# Patient Record
Sex: MALE | Race: OTHER | NOT HISPANIC OR LATINO
[De-identification: names, ages, dates, MRNs, and addresses within clinical notes are randomized per-mention and may not be internally consistent; named-entity substitution may affect disease eponyms.]

---

## 2019-01-10 ENCOUNTER — RX RENEWAL (OUTPATIENT)
Age: 39
End: 2019-01-10

## 2019-01-10 PROBLEM — Z00.00 ENCOUNTER FOR PREVENTIVE HEALTH EXAMINATION: Status: ACTIVE | Noted: 2019-01-10

## 2019-07-01 ENCOUNTER — RX RENEWAL (OUTPATIENT)
Age: 39
End: 2019-07-01

## 2019-07-03 ENCOUNTER — RECORD ABSTRACTING (OUTPATIENT)
Age: 39
End: 2019-07-03

## 2019-07-04 ENCOUNTER — RECORD ABSTRACTING (OUTPATIENT)
Age: 39
End: 2019-07-04

## 2019-07-04 DIAGNOSIS — Z83.49 FAMILY HISTORY OF OTHER ENDOCRINE, NUTRITIONAL AND METABOLIC DISEASES: ICD-10-CM

## 2019-07-10 ENCOUNTER — APPOINTMENT (OUTPATIENT)
Dept: GASTROENTEROLOGY | Facility: CLINIC | Age: 39
End: 2019-07-10
Payer: COMMERCIAL

## 2019-07-10 VITALS
OXYGEN SATURATION: 96 % | HEIGHT: 69.5 IN | DIASTOLIC BLOOD PRESSURE: 79 MMHG | SYSTOLIC BLOOD PRESSURE: 112 MMHG | HEART RATE: 78 BPM | WEIGHT: 176 LBS | BODY MASS INDEX: 25.48 KG/M2

## 2019-07-10 PROCEDURE — 36415 COLL VENOUS BLD VENIPUNCTURE: CPT

## 2019-07-10 PROCEDURE — 99214 OFFICE O/P EST MOD 30 MIN: CPT | Mod: 25

## 2019-07-10 RX ORDER — MESALAMINE 1000 MG/1
1000 SUPPOSITORY RECTAL
Refills: 0 | Status: DISCONTINUED | COMMUNITY
End: 2019-07-10

## 2019-07-10 RX ORDER — BUDESONIDE 9 MG/1
9 TABLET, EXTENDED RELEASE ORAL DAILY
Refills: 0 | Status: DISCONTINUED | COMMUNITY
End: 2019-07-10

## 2019-07-10 RX ORDER — PREDNISONE 5 MG/1
5 TABLET ORAL
Refills: 0 | Status: DISCONTINUED | COMMUNITY
End: 2019-07-10

## 2019-07-10 RX ORDER — HYDROCORTISONE ACETATE 1500 MG/15G
10 AEROSOL, FOAM TOPICAL
Refills: 0 | Status: DISCONTINUED | COMMUNITY
End: 2019-07-10

## 2019-07-10 RX ORDER — HYDROCORTISONE ACETATE 25 MG/1
25 SUPPOSITORY RECTAL TWICE DAILY
Refills: 0 | Status: DISCONTINUED | COMMUNITY
End: 2019-07-10

## 2019-07-10 RX ORDER — HYDROCORTISONE 100 MG/60ML
100 ENEMA RECTAL
Refills: 0 | Status: DISCONTINUED | COMMUNITY
End: 2019-07-10

## 2019-07-10 RX ORDER — MESALAMINE 375 MG/1
0.38 CAPSULE, EXTENDED RELEASE ORAL DAILY
Refills: 0 | Status: DISCONTINUED | COMMUNITY
End: 2019-07-10

## 2019-07-10 NOTE — HISTORY OF PRESENT ILLNESS
[FreeTextEntry1] : 38m hx UC here for f/u UC, w/ steroid tapers 2/018 (failed enemas, budesonide), progressive steroid taper requirements/steroid dependent, leading to humira initiation 7/2018 - never f/u since.  Went into complete remission with 1 formed BM daily - no bleeding, but in last month, occ blood, occ anal bulge.  No abd pain. Stools formed, but slightly less so in days leading up to injection day - perhaps 2x/d.  No wt loss. No fever.\par \par Prior hx:\par        Quantiferon neg 3/23/18\par        HBsAg and HBsAb neg, cAb neg\par         \par        Dr. Rodriguez:\par        Dx '08 - flex sig\par        asacol/pred---> asacol---> lialda - takes 1-2 wks prednisone 1x/year.\par        L-sided UC to S. flex 2/2014\par        Path only fro 8/2016 colon w/ at least microscopic IBD changes L>R. \par        Other:\par        Adx pancreatitis, mild 1/3/18 NWH\par        US 1/4/18: No cholelithiasis or bilary tract obstruction. Nonspecific 1.9 cm hypoechoic focus in               pancreatic head region. Mild diffuse  diminished pancreatic echogenicity.\par        CT+ 1/4/18: No evidence for pancreatic mass. Findings suggesting equivocal/early pancreatitis        without complication. Follow-up to resolution is recommended to exclude underlying occult mass.\par        Similar episode at [facility] approx 6y prior. Denies EtOH beyond 1-2 drinks weekly. \par        MRI+/MRCP 3/16/18 normal\par        4/2018 colonoscopy w/ pancolitis, inflammatory polyps - repeat 1y.\par \par Soc:  no tobacco or significant EtOH\par FHx: no FHx GI malignancy or IBD\par \par ROS:\par Constitutional:: no weight loss, fevers\par ENT: no deafness\par Eyes: not blind\par Neck: no LN\par Chest: no dyspnea/cough\par Cardiac: no chest pain\par Vascular: no leg swelling\par GI: no abdominal pain, nausea, vomiting, diarrhea, constipation, rectal bleeding, dysphagia, melena unless otherwise noted in HPI\par : no dysuria, dark urine\par Skin: no rashes, jaundice\par Heme: no bleeding\par \par Px: (VS noted below)\par General: NAD\par Eyes: anicteric\par Oropharynx:  clear\par Neck: no LN\par Chest: normal respiratory effort\par CVS: regular\par Abd: soft, NT, ND, +BS, no HSM\par Ext: no atrophy\par Neuro: grossly nonfocal\par \par

## 2019-07-10 NOTE — ASSESSMENT
[FreeTextEntry1] : colitis: doing better - off steroids x1y.  On humira. Perhaps some resistance building - will check ADA levels.  Start hct suppository to optimize hemorrhoids.  Resume apriso to see if this can add efficacy/control.  Will need colonoscopy once these issues elucidated futher.  Pt agreeable to colonoscopy in upcoming 3-4months.  No evidence of infectious complications at this time.

## 2019-07-10 NOTE — CONSULT LETTER
[Dear  ___] : Dear  [unfilled], [Consult Letter:] : I had the pleasure of evaluating your patient, [unfilled]. [Please see my note below.] : Please see my note below. [FreeTextEntry1] : Thank you very much for allowing me to participate in the care of this patient.  If you have any questions, please do not hesitate to contact me.\par \par Sincerely, \par \par Brien Heath MD\par

## 2019-07-11 LAB
ALBUMIN SERPL ELPH-MCNC: 4.5 G/DL
ALP BLD-CCNC: 51 U/L
ALT SERPL-CCNC: 29 U/L
ANION GAP SERPL CALC-SCNC: 12 MMOL/L
AST SERPL-CCNC: 25 U/L
BASOPHILS # BLD AUTO: 0.09 K/UL
BASOPHILS NFR BLD AUTO: 1.2 %
BILIRUB SERPL-MCNC: 0.8 MG/DL
BUN SERPL-MCNC: 20 MG/DL
CALCIUM SERPL-MCNC: 10.5 MG/DL
CHLORIDE SERPL-SCNC: 100 MMOL/L
CO2 SERPL-SCNC: 27 MMOL/L
CREAT SERPL-MCNC: 1.06 MG/DL
EOSINOPHIL # BLD AUTO: 0.29 K/UL
EOSINOPHIL NFR BLD AUTO: 3.9 %
ERYTHROCYTE [SEDIMENTATION RATE] IN BLOOD BY WESTERGREN METHOD: 19 MM/HR
GLUCOSE SERPL-MCNC: 95 MG/DL
HCT VFR BLD CALC: 49.2 %
HGB BLD-MCNC: 15.6 G/DL
IMM GRANULOCYTES NFR BLD AUTO: 0.3 %
LYMPHOCYTES # BLD AUTO: 3.09 K/UL
LYMPHOCYTES NFR BLD AUTO: 41.7 %
MAN DIFF?: NORMAL
MCHC RBC-ENTMCNC: 29.8 PG
MCHC RBC-ENTMCNC: 31.7 GM/DL
MCV RBC AUTO: 94.1 FL
MONOCYTES # BLD AUTO: 0.71 K/UL
MONOCYTES NFR BLD AUTO: 9.6 %
NEUTROPHILS # BLD AUTO: 3.21 K/UL
NEUTROPHILS NFR BLD AUTO: 43.3 %
PLATELET # BLD AUTO: 362 K/UL
POTASSIUM SERPL-SCNC: 4.4 MMOL/L
PROT SERPL-MCNC: 7.4 G/DL
RBC # BLD: 5.23 M/UL
RBC # FLD: 12.8 %
SODIUM SERPL-SCNC: 139 MMOL/L
WBC # FLD AUTO: 7.41 K/UL

## 2019-07-17 LAB
ADALIMUMAB AB SERPL-MCNC: 27 NG/ML
ADALIMUMAB SERPL-MCNC: 7.5 UG/ML

## 2019-09-16 ENCOUNTER — RX RENEWAL (OUTPATIENT)
Age: 39
End: 2019-09-16

## 2019-11-18 ENCOUNTER — APPOINTMENT (OUTPATIENT)
Dept: GASTROENTEROLOGY | Facility: HOSPITAL | Age: 39
End: 2019-11-18

## 2019-12-08 ENCOUNTER — RESULT REVIEW (OUTPATIENT)
Age: 39
End: 2019-12-08

## 2019-12-09 ENCOUNTER — APPOINTMENT (OUTPATIENT)
Dept: GASTROENTEROLOGY | Facility: HOSPITAL | Age: 39
End: 2019-12-09

## 2020-01-06 ENCOUNTER — CLINICAL ADVICE (OUTPATIENT)
Age: 40
End: 2020-01-06

## 2020-01-13 ENCOUNTER — RX RENEWAL (OUTPATIENT)
Age: 40
End: 2020-01-13

## 2020-01-13 ENCOUNTER — MOBILE ON CALL (OUTPATIENT)
Age: 40
End: 2020-01-13

## 2020-06-17 ENCOUNTER — APPOINTMENT (OUTPATIENT)
Dept: GASTROENTEROLOGY | Facility: CLINIC | Age: 40
End: 2020-06-17
Payer: COMMERCIAL

## 2020-06-17 DIAGNOSIS — K64.9 UNSPECIFIED HEMORRHOIDS: ICD-10-CM

## 2020-06-17 PROCEDURE — 99214 OFFICE O/P EST MOD 30 MIN: CPT | Mod: 95

## 2020-06-17 NOTE — ASSESSMENT
[FreeTextEntry1] : colitis: remission.  Check labs as below.  Cont humira weekly.  Colonoscopy 2021.  Derm check annually - pt advised.\par \par hemorrhoids - noncompliant w/ tx - rec tx steroid suppository \par

## 2020-06-17 NOTE — HISTORY OF PRESENT ILLNESS
[Home] : at home, [unfilled] , at the time of the visit. [Medical Office: ([facility])___] : at the medical office located in  [FreeTextEntry1] : A verbal consent was previously obtained from the patient for this specific telehealth visit (see attached document). Visit done from my office in [place], [place].\par \par Time started: 10:04\par Time ended: 10:21\par Total time (minutes): 27min and > 50% of the time spent in the encounter involved counseling and coordination of care for any or all of the diagnoses submitted \par \par CHIEF COMPLAINT: \par colitis\par \par HPI:  \par 39m hx UC here for f/u UC, w/ steroid tapers 2/018 (failed enemas, budesonide), progressive steroid taper requirements/steroid dependent, leading to humira initiation 7/2018, increased to weekly w/ marginal levela, patchy colitis 1/2020 L>>R.  Formed BM daily - no bleeding, chronic prolapsing hemorrhoids when diarrhea worsens - never compliant w/ steroid suppositories as has been recommended on many occasions.  No Abs to ADA 1/2020.   No wt loss. No fever.  Not taking mesalamine anymore.\par \par Prior hx:\par  Quantiferon neg 3/23/18\par  HBsAg and HBsAb neg, cAb neg\par  \par  Dr. Rodriguez:\par  Dx '08 - flex sig\par  asacol/pred---> asacol---> lialda - takes 1-2 wks prednisone 1x/year.\par  L-sided UC to S. flex 2/2014\par  Path only fro 8/2016 colon w/ at least microscopic IBD changes L>R. \par  Other:\par  Adx pancreatitis, mild 1/3/18 NWH\par  US 1/4/18: No cholelithiasis or bilary tract obstruction. Nonspecific 1.9 cm hypoechoic focus in pancreatic head region. Mild diffuse diminished pancreatic echogenicity.\par  CT+ 1/4/18: No evidence for pancreatic mass. Findings suggesting equivocal/early pancreatitis without complication. Follow-up to resolution is recommended to exclude underlying occult mass.\par  Similar episode at [facility] approx 6y prior. Denies EtOH beyond 1-2 drinks weekly. \par  MRI+/MRCP 3/16/18 normal\par  4/2018 colonoscopy w/ pancolitis, inflammatory polyps - repeat 1y.\par 7/2019 ADA 7.5, low Ab titer 27\par 12/2019 colitis L>>R, hemorrhoids, inflammatory polyp - repeat 1-2y.\par Increased humira to weekly 1/2020.\par \par Soc:  no tobacco or significant EtOH\par FHx: no FHx GI malignancy or IBD\par \par ROS:\par Constitutional:: no weight loss, fevers\par ENT: no deafness\par Eyes: not blind\par Neck: no LN\par Chest: no dyspnea/cough\par Cardiac: no chest pain\par Vascular: no leg swelling\par GI: no abdominal pain, nausea, vomiting, diarrhea, constipation, rectal bleeding, dysphagia, melena unless otherwise noted in HPI\par : no dysuria, dark urine\par Skin: no rashes, jaundice\par Heme: no bleeding\par Endocrine: no DM unless otherwise stated in HPI\par \par Px: telehealth.  NAD\par \par Labs/imaging/prior endoscopic results reviewed to the extent available and noted in HP.  IA verbal consent was previously obtained from the patient for this specific telehealth visit (see attached document). Visit done from my office in [place], [place].   Pt at home. Others present: none. Privacy concerns addressed.\par \par

## 2020-07-06 ENCOUNTER — RX RENEWAL (OUTPATIENT)
Age: 40
End: 2020-07-06

## 2021-02-03 ENCOUNTER — RX RENEWAL (OUTPATIENT)
Age: 41
End: 2021-02-03

## 2021-07-29 ENCOUNTER — RX RENEWAL (OUTPATIENT)
Age: 41
End: 2021-07-29

## 2021-09-09 ENCOUNTER — NON-APPOINTMENT (OUTPATIENT)
Age: 41
End: 2021-09-09

## 2021-09-09 ENCOUNTER — APPOINTMENT (OUTPATIENT)
Dept: GASTROENTEROLOGY | Facility: CLINIC | Age: 41
End: 2021-09-09
Payer: COMMERCIAL

## 2021-09-09 VITALS
BODY MASS INDEX: 26.06 KG/M2 | DIASTOLIC BLOOD PRESSURE: 80 MMHG | HEIGHT: 69.5 IN | SYSTOLIC BLOOD PRESSURE: 110 MMHG | OXYGEN SATURATION: 97 % | WEIGHT: 180 LBS | TEMPERATURE: 96.3 F | HEART RATE: 101 BPM

## 2021-09-09 DIAGNOSIS — K52.9 NONINFECTIVE GASTROENTERITIS AND COLITIS, UNSPECIFIED: ICD-10-CM

## 2021-09-09 PROCEDURE — 99214 OFFICE O/P EST MOD 30 MIN: CPT | Mod: 25

## 2021-09-09 PROCEDURE — 36415 COLL VENOUS BLD VENIPUNCTURE: CPT

## 2021-09-09 RX ORDER — ADALIMUMAB 40MG/0.8ML
40 KIT SUBCUTANEOUS
Qty: 2 | Refills: 1 | Status: DISCONTINUED | COMMUNITY
Start: 2019-01-10 | End: 2021-09-09

## 2021-09-09 RX ORDER — MESALAMINE 375 MG/1
0.38 CAPSULE, EXTENDED RELEASE ORAL DAILY
Qty: 120 | Refills: 5 | Status: DISCONTINUED | COMMUNITY
Start: 2019-07-10 | End: 2021-09-09

## 2021-09-09 RX ORDER — HYDROCORTISONE ACETATE 25 MG/1
25 SUPPOSITORY RECTAL TWICE DAILY
Qty: 60 | Refills: 1 | Status: DISCONTINUED | COMMUNITY
Start: 2019-07-10 | End: 2021-09-09

## 2021-09-09 NOTE — ASSESSMENT
[FreeTextEntry1] : colitis: remission.  Check labs as below,including Quant gold.   Cont humira weekly.  Colonoscopy 2021.  Derm check annually.  Will need to see if bloating related to colitis.\par \par PMD/consultation/hospital notes and Labs/imaging/prior endoscopic results reviewed to extent noted in HPI; and, if procedure code billed on this visit for lab draw, this serves to signify that labs were drawn here in this office.\par  \par

## 2021-09-09 NOTE — CONSULT LETTER
[FreeTextEntry1] : Dear Dr. NEFTALI CHRISTINA ,\par \par I had the pleasure of evaluating your patient,  RAFFAELE LINDSEY.\par \par Please refer to my note below.\par \par Thank you very much for allowing me to participate in the care of this patient.  If you have any questions, please do not hesitate to contact me.\par \par Sincerely, \par \par Brien Heath MD\par

## 2021-09-09 NOTE — HISTORY OF PRESENT ILLNESS
[FreeTextEntry1] : 41m hx UC here for f/u UC, w/ steroid tapers 2/018 (failed enemas, budesonide), progressive steroid taper requirements/steroid dependent, leading to humira initiation 7/2018, increased to weekly w/ marginal level, patchy colitis 1/2020 L>>R\par -here for f/u - one soft, incomplete form BM daily - no bleeding, chronic prolapsing hemorrhoids, pain.  Some bloating in afternoons.  No wt loss, fevers.\par \par No Abs to ADA 1/2020.   \par \par Prior hx:\par  Quantiferon neg 3/23/18\par  HBsAg and HBsAb neg, cAb neg\par  \par  Dr. Rodriguez:\par  Dx '08 - flex sig\par  asacol/pred---> asacol---> lialda - takes 1-2 wks prednisone 1x/year.\par  L-sided UC to S. flex 2/2014\par  Path only fro 8/2016 colon w/ at least microscopic IBD changes L>R. \par  Other:\par  Adx pancreatitis, mild 1/3/18 NWH\par  US 1/4/18: No cholelithiasis or bilary tract obstruction. Nonspecific 1.9 cm hypoechoic focus in pancreatic head region. Mild diffuse diminished pancreatic echogenicity.\par  CT+ 1/4/18: No evidence for pancreatic mass. Findings suggesting equivocal/early pancreatitis without complication. Follow-up to resolution is recommended to exclude underlying occult mass.\par  Similar episode at [facility] approx 6y prior. Denies EtOH beyond 1-2 drinks weekly. \par  MRI+/MRCP 3/16/18 normal\par  4/2018 colonoscopy w/ pancolitis, inflammatory polyps - repeat 1y.\par 7/2019 ADA 7.5, low Ab titer 27\par 12/2019 colitis L>>R, hemorrhoids, inflammatory polyp - repeat 1-2y.\par Increased humira to weekly 1/2020.\par \par Soc:  no tobacco or significant EtOH\par FHx: no FHx GI malignancy or IBD\par \par ROS:\par Constitutional:: no weight loss, fevers\par ENT: no deafness\par Eyes: not blind\par Neck: no LN\par Chest: no dyspnea/cough\par Cardiac: no chest pain\par Vascular: no leg swelling\par GI: no abdominal pain, nausea, vomiting, diarrhea, constipation, rectal bleeding, dysphagia, melena unless otherwise noted in HPI\par : no dysuria, dark urine\par Skin: no rashes, jaundice\par Heme: no bleeding\par Endocrine: no DM unless otherwise stated in HPI\par \par Px: (VS noted below)\par General: NAD\par Eyes: anicteric\par Oropharynx:  clear\par Neck: no LN\par Chest: normal respiratory effort\par CVS: regular\par Abd: soft, NT, ND, +BS, no HSM\par Ext: no atrophy\par Neuro: grossly nonfocal\par \par Labs/imaging/prior endoscopic results reviewed to the extent available and noted in HPI

## 2021-09-10 LAB
ALBUMIN SERPL ELPH-MCNC: 4.7 G/DL
ALP BLD-CCNC: 53 U/L
ALT SERPL-CCNC: 25 U/L
ANION GAP SERPL CALC-SCNC: 13 MMOL/L
AST SERPL-CCNC: 23 U/L
BASOPHILS # BLD AUTO: 0.06 K/UL
BASOPHILS NFR BLD AUTO: 0.7 %
BILIRUB SERPL-MCNC: 1 MG/DL
BUN SERPL-MCNC: 20 MG/DL
CALCIUM SERPL-MCNC: 10.1 MG/DL
CHLORIDE SERPL-SCNC: 101 MMOL/L
CO2 SERPL-SCNC: 22 MMOL/L
CREAT SERPL-MCNC: 1.04 MG/DL
EOSINOPHIL # BLD AUTO: 0.26 K/UL
EOSINOPHIL NFR BLD AUTO: 3.2 %
GLUCOSE SERPL-MCNC: 104 MG/DL
HCT VFR BLD CALC: 52.3 %
HGB BLD-MCNC: 16.2 G/DL
IMM GRANULOCYTES NFR BLD AUTO: 0.1 %
LYMPHOCYTES # BLD AUTO: 2.96 K/UL
LYMPHOCYTES NFR BLD AUTO: 36.9 %
MAN DIFF?: NORMAL
MCHC RBC-ENTMCNC: 29.6 PG
MCHC RBC-ENTMCNC: 31 GM/DL
MCV RBC AUTO: 95.6 FL
MONOCYTES # BLD AUTO: 0.62 K/UL
MONOCYTES NFR BLD AUTO: 7.7 %
NEUTROPHILS # BLD AUTO: 4.12 K/UL
NEUTROPHILS NFR BLD AUTO: 51.4 %
PLATELET # BLD AUTO: 369 K/UL
POTASSIUM SERPL-SCNC: 4.7 MMOL/L
PROT SERPL-MCNC: 7.6 G/DL
RBC # BLD: 5.47 M/UL
RBC # FLD: 12.9 %
SODIUM SERPL-SCNC: 136 MMOL/L
WBC # FLD AUTO: 8.03 K/UL

## 2021-09-13 LAB
M TB IFN-G BLD-IMP: NEGATIVE
QUANTIFERON TB PLUS MITOGEN MINUS NIL: 9.11 IU/ML
QUANTIFERON TB PLUS NIL: 0.02 IU/ML
QUANTIFERON TB PLUS TB1 MINUS NIL: 0.01 IU/ML
QUANTIFERON TB PLUS TB2 MINUS NIL: 0.02 IU/ML

## 2021-10-02 ENCOUNTER — RESULT REVIEW (OUTPATIENT)
Age: 41
End: 2021-10-02

## 2021-10-04 ENCOUNTER — RESULT REVIEW (OUTPATIENT)
Age: 41
End: 2021-10-04

## 2021-10-05 ENCOUNTER — APPOINTMENT (OUTPATIENT)
Dept: GASTROENTEROLOGY | Facility: HOSPITAL | Age: 41
End: 2021-10-05

## 2021-10-07 ENCOUNTER — RX RENEWAL (OUTPATIENT)
Age: 41
End: 2021-10-07

## 2021-10-07 RX ORDER — MESALAMINE 1000 MG/1
1000 SUPPOSITORY RECTAL
Qty: 30 | Refills: 1 | Status: ACTIVE | COMMUNITY
Start: 2021-10-05 | End: 1900-01-01

## 2022-01-05 ENCOUNTER — RX RENEWAL (OUTPATIENT)
Age: 42
End: 2022-01-05

## 2022-08-29 ENCOUNTER — RX RENEWAL (OUTPATIENT)
Age: 42
End: 2022-08-29

## 2022-08-29 RX ORDER — ADALIMUMAB 40MG/0.4ML
40 KIT SUBCUTANEOUS
Qty: 4 | Refills: 5 | Status: ACTIVE | COMMUNITY
Start: 2020-07-06 | End: 1900-01-01

## 2022-12-23 ENCOUNTER — TRANSCRIPTION ENCOUNTER (OUTPATIENT)
Age: 42
End: 2022-12-23

## 2024-06-03 ENCOUNTER — APPOINTMENT (OUTPATIENT)
Dept: GASTROENTEROLOGY | Facility: CLINIC | Age: 44
End: 2024-06-03
Payer: COMMERCIAL

## 2024-06-03 VITALS
TEMPERATURE: 98 F | SYSTOLIC BLOOD PRESSURE: 126 MMHG | RESPIRATION RATE: 19 BRPM | WEIGHT: 186.2 LBS | BODY MASS INDEX: 26.96 KG/M2 | OXYGEN SATURATION: 97 % | DIASTOLIC BLOOD PRESSURE: 78 MMHG | HEART RATE: 92 BPM | HEIGHT: 69.5 IN

## 2024-06-03 DIAGNOSIS — R14.0 ABDOMINAL DISTENSION (GASEOUS): ICD-10-CM

## 2024-06-03 DIAGNOSIS — K51.90 ULCERATIVE COLITIS, UNSPECIFIED, W/OUT COMPLICATIONS: ICD-10-CM

## 2024-06-03 PROCEDURE — 99215 OFFICE O/P EST HI 40 MIN: CPT

## 2024-06-03 PROCEDURE — G2211 COMPLEX E/M VISIT ADD ON: CPT

## 2024-06-03 NOTE — PHYSICAL EXAM
[Alert] : alert [Normal Voice/Communication] : normal voice/communication [Healthy Appearing] : healthy appearing [No Acute Distress] : no acute distress [Sclera] : the sclera and conjunctiva were normal [Hearing Threshold Finger Rub Not [place]] : hearing was normal [Normal Lips/Gums] : the lips and gums were normal [Oropharynx] : the oropharynx was normal [Normal Appearance] : the appearance of the neck was normal [No Neck Mass] : no neck mass was observed [No Respiratory Distress] : no respiratory distress [No Acc Muscle Use] : no accessory muscle use [Respiration, Rhythm And Depth] : normal respiratory rhythm and effort [Abdomen Tenderness] : non-tender [No Masses] : no abdominal mass palpated [Abdomen Soft] : soft [] : no hepatosplenomegaly [Oriented To Time, Place, And Person] : oriented to person, place, and time

## 2024-06-03 NOTE — PHYSICAL EXAM
[Alert] : alert [Normal Voice/Communication] : normal voice/communication [Healthy Appearing] : healthy appearing [No Acute Distress] : no acute distress [Sclera] : the sclera and conjunctiva were normal [Hearing Threshold Finger Rub Not [place]] : hearing was normal [Normal Lips/Gums] : the lips and gums were normal [Oropharynx] : the oropharynx was normal [Normal Appearance] : the appearance of the neck was normal [No Neck Mass] : no neck mass was observed [No Respiratory Distress] : no respiratory distress [No Acc Muscle Use] : no accessory muscle use [Respiration, Rhythm And Depth] : normal respiratory rhythm and effort [Abdomen Tenderness] : non-tender [No Masses] : no abdominal mass palpated [Abdomen Soft] : soft [] : no hepatosplenomegaly [Oriented To Time, Place, And Person] : oriented to person, place, and time

## 2024-06-09 NOTE — CONSULT LETTER
[Dear  ___] : Dear  [unfilled], [Consult Letter:] : I had the pleasure of evaluating your patient, [unfilled]. [Please see my note below.] : Please see my note below. [Consult Closing:] : Thank you very much for allowing me to participate in the care of this patient.  If you have any questions, please do not hesitate to contact me. [Sincerely,] : Sincerely, [FreeTextEntry3] : Jay Veras MD Professor of Medicine Chief of GI Director IBD Program [facility]

## 2024-06-09 NOTE — ASSESSMENT
[FreeTextEntry1] : 43M PMHx hx of multiple prior episodes of pancreatitis (?Mesalamine vs EtOH induced), pancolitis UC, on ADA q1wk with active disease in rectum based on Aug 2022 colonoscopy presenting as a referral from ER/ previously seen by Dr. Heath in 2021; Now for complaints of fatigue, bloating, flatulence after recent UC flare.   #UC pancolitis  #Abdominal bloating  History of UC pancolitis, on Humira weekly, with recent flare, unclear trigger, rectal bleeding and increased BW frequency resolved however with persistent bloating. ddx includes SIBO vs post-infectious IBS  -Last colonoscopy in Aug 2022, with active proctitis, due for surveillance colonoscopy to also assess for mucosal healing, will tentatively plan for August 2024, Miralax with MgCitrate prep, R/B/A/L discussed with patient. All questions answered to patient's satisfaction. -Ordered BW today: CBC, CMP, CRP, Hep B serologies, QuantiFERON TB, TPMT -Stool studies: fecal elastase (reviewed outside [facility] from 2023, normal appearing pancreas) and fecal calprotectin  -Abdominal XRay to assess stool burden  -Ordered for H pylori breath test (performed in office) -Ordered for SIBO breath test, reviewed kit/instructions as well as dietary restrictions with pt -c/w Humira q1week   TEB in 3 weeks to discuss BW, stool tests, SIBO results  Liza Doyle DO Gastroenterology Fellow

## 2024-06-09 NOTE — HISTORY OF PRESENT ILLNESS
[FreeTextEntry1] : Colonoscopy (10/5/21): A single 15 mm polyp in distal TC, appeared to have an inflammatory base, s/p Orise lift, s/p hot snare piecemeal polypectomy, s/p clip x1 and tattoo distal to resection. No e/o colitis except for minimal proctitis, about 5 cm, bx-ed, along with protocol bx throughout. Normal TI. 2 mm polyp in sigmoid via bx forceps. Internal hemorrhoids.   Colonoscopy (12/9/19): 1 cm polyp in DC, appeared inflammatory, bx DC at base as well as separately. Patchy colitis involving hemorrhoids but little else in rectum, with more patchy colitis evident throughout colon, most in sigmoid/descending colon, less in transverse, none in ascending colon, but cecum involved.   Colonoscopy (4/24/18): pancolitis, small internal hemorrhoids, probable inflammatory polyps

## 2024-06-09 NOTE — CONSULT LETTER
[Dear  ___] : Dear  [unfilled], [Consult Letter:] : I had the pleasure of evaluating your patient, [unfilled]. [Please see my note below.] : Please see my note below. [Consult Closing:] : Thank you very much for allowing me to participate in the care of this patient.  If you have any questions, please do not hesitate to contact me. [Sincerely,] : Sincerely, [FreeTextEntry3] : Jay Veras MD Professor of Medicine Chief of GI Director IBD Program [facility] details… detailed exam

## 2024-06-09 NOTE — REVIEW OF SYSTEMS
[As Noted in HPI] : as noted in HPI [Bloating (gassiness)] : bloating [Negative] : Heme/Lymph [Abdominal Pain] : no abdominal pain [Vomiting] : no vomiting [Constipation] : no constipation [Diarrhea] : no diarrhea [Heartburn] : no heartburn [Melena (black stool)] : no melena [Bleeding] : no bleeding [Fecal Incontinence (soiling)] : no fecal incontinence

## 2024-06-17 ENCOUNTER — TRANSCRIPTION ENCOUNTER (OUTPATIENT)
Age: 44
End: 2024-06-17

## 2024-06-17 LAB
ALBUMIN SERPL ELPH-MCNC: 4.5 G/DL
ALP BLD-CCNC: 54 U/L
ALT SERPL-CCNC: 34 U/L
ANION GAP SERPL CALC-SCNC: 14 MMOL/L
AST SERPL-CCNC: 28 U/L
BASOPHILS # BLD AUTO: 0.08 K/UL
BASOPHILS NFR BLD AUTO: 0.8 %
BILIRUB SERPL-MCNC: 0.6 MG/DL
BUN SERPL-MCNC: 21 MG/DL
CALCIUM SERPL-MCNC: 10.4 MG/DL
CALPROTECTIN FECAL: 48 UG/G
CHLORIDE SERPL-SCNC: 103 MMOL/L
CO2 SERPL-SCNC: 21 MMOL/L
CREAT SERPL-MCNC: 1.09 MG/DL
CRP SERPL-MCNC: <3 MG/L
EGFR: 85 ML/MIN/1.73M2
EOSINOPHIL # BLD AUTO: 0.19 K/UL
EOSINOPHIL NFR BLD AUTO: 1.9 %
GLUCOSE SERPL-MCNC: 101 MG/DL
HBV CORE IGG+IGM SER QL: NONREACTIVE
HBV SURFACE AB SER QL: NONREACTIVE
HBV SURFACE AG SER QL: NONREACTIVE
HCT VFR BLD CALC: 49.9 %
HGB BLD-MCNC: 15.8 G/DL
IGG4 SER-MCNC: 50.7 MG/DL
IMM GRANULOCYTES NFR BLD AUTO: 0.1 %
LYMPHOCYTES # BLD AUTO: 3.22 K/UL
LYMPHOCYTES NFR BLD AUTO: 32.2 %
M TB IFN-G BLD-IMP: NEGATIVE
MAN DIFF?: NORMAL
MCHC RBC-ENTMCNC: 29.3 PG
MCHC RBC-ENTMCNC: 31.7 GM/DL
MCV RBC AUTO: 92.6 FL
MONOCYTES # BLD AUTO: 0.77 K/UL
MONOCYTES NFR BLD AUTO: 7.7 %
NEUTROPHILS # BLD AUTO: 5.72 K/UL
NEUTROPHILS NFR BLD AUTO: 57.3 %
PANCREATIC ELASTASE, FECAL: >800 CD:794062645
PLATELET # BLD AUTO: 330 K/UL
POTASSIUM SERPL-SCNC: 4.3 MMOL/L
PROT SERPL-MCNC: 7.8 G/DL
QUANTIFERON TB PLUS MITOGEN MINUS NIL: >10 IU/ML
QUANTIFERON TB PLUS NIL: 0.02 IU/ML
QUANTIFERON TB PLUS TB1 MINUS NIL: 0.03 IU/ML
QUANTIFERON TB PLUS TB2 MINUS NIL: 0.01 IU/ML
RBC # BLD: 5.39 M/UL
RBC # FLD: 12.6 %
SODIUM SERPL-SCNC: 138 MMOL/L
TPMT ENZYME INTERPRETATION: NORMAL
TPMT ENZYME METHODOLOGY: NORMAL
TPMT ENZYME: 17.8
UREA BREATH TEST QL: NEGATIVE
WBC # FLD AUTO: 9.99 K/UL

## 2024-07-02 ENCOUNTER — NON-APPOINTMENT (OUTPATIENT)
Age: 44
End: 2024-07-02

## 2024-07-02 ENCOUNTER — TRANSCRIPTION ENCOUNTER (OUTPATIENT)
Age: 44
End: 2024-07-02

## 2024-07-03 ENCOUNTER — NON-APPOINTMENT (OUTPATIENT)
Age: 44
End: 2024-07-03

## 2024-07-08 ENCOUNTER — NON-APPOINTMENT (OUTPATIENT)
Age: 44
End: 2024-07-08

## 2024-07-11 ENCOUNTER — TRANSCRIPTION ENCOUNTER (OUTPATIENT)
Age: 44
End: 2024-07-11

## 2024-07-16 ENCOUNTER — APPOINTMENT (OUTPATIENT)
Dept: GASTROENTEROLOGY | Facility: CLINIC | Age: 44
End: 2024-07-16
Payer: COMMERCIAL

## 2024-07-16 DIAGNOSIS — K51.90 ULCERATIVE COLITIS, UNSPECIFIED, W/OUT COMPLICATIONS: ICD-10-CM

## 2024-07-16 DIAGNOSIS — R14.0 ABDOMINAL DISTENSION (GASEOUS): ICD-10-CM

## 2024-07-16 PROCEDURE — G2211 COMPLEX E/M VISIT ADD ON: CPT

## 2024-07-16 PROCEDURE — 99214 OFFICE O/P EST MOD 30 MIN: CPT

## 2024-09-03 ENCOUNTER — TRANSCRIPTION ENCOUNTER (OUTPATIENT)
Age: 44
End: 2024-09-03

## 2024-09-13 ENCOUNTER — TRANSCRIPTION ENCOUNTER (OUTPATIENT)
Age: 44
End: 2024-09-13

## 2024-10-10 ENCOUNTER — APPOINTMENT (OUTPATIENT)
Age: 44
End: 2024-10-10

## 2024-10-22 ENCOUNTER — APPOINTMENT (OUTPATIENT)
Dept: GASTROENTEROLOGY | Facility: CLINIC | Age: 44
End: 2024-10-22